# Patient Record
Sex: FEMALE | Race: BLACK OR AFRICAN AMERICAN | NOT HISPANIC OR LATINO | URBAN - NONMETROPOLITAN AREA
[De-identification: names, ages, dates, MRNs, and addresses within clinical notes are randomized per-mention and may not be internally consistent; named-entity substitution may affect disease eponyms.]

---

## 2020-11-19 ENCOUNTER — LAB OUTSIDE AN ENCOUNTER (OUTPATIENT)
Dept: URBAN - NONMETROPOLITAN AREA CLINIC 2 | Facility: CLINIC | Age: 40
End: 2020-11-19

## 2020-11-19 ENCOUNTER — OFFICE VISIT (OUTPATIENT)
Dept: URBAN - NONMETROPOLITAN AREA CLINIC 2 | Facility: CLINIC | Age: 40
End: 2020-11-19
Payer: COMMERCIAL

## 2020-11-19 VITALS
HEART RATE: 73 BPM | SYSTOLIC BLOOD PRESSURE: 123 MMHG | DIASTOLIC BLOOD PRESSURE: 78 MMHG | BODY MASS INDEX: 31.12 KG/M2 | WEIGHT: 186.8 LBS | HEIGHT: 65 IN

## 2020-11-19 DIAGNOSIS — R10.84 GENERALIZED ABDOMINAL PAIN: ICD-10-CM

## 2020-11-19 DIAGNOSIS — K59.00 CONSTIPATION, UNSPECIFIED CONSTIPATION TYPE: ICD-10-CM

## 2020-11-19 PROCEDURE — 99204 OFFICE O/P NEW MOD 45 MIN: CPT | Performed by: NURSE PRACTITIONER

## 2020-11-19 PROCEDURE — G8427 DOCREV CUR MEDS BY ELIG CLIN: HCPCS | Performed by: NURSE PRACTITIONER

## 2020-11-19 PROCEDURE — G8482 FLU IMMUNIZE ORDER/ADMIN: HCPCS | Performed by: NURSE PRACTITIONER

## 2020-11-19 PROCEDURE — G8420 CALC BMI NORM PARAMETERS: HCPCS | Performed by: NURSE PRACTITIONER

## 2020-11-19 NOTE — HPI-TODAY'S VISIT:
11/19/2020 Ms. Tona Luciano is a 40 year old female here for diffuse abdominal pain. The pain started 2 weeks ago. She has lower abdominal pain and right sided lower abdominal pain that radiates up into her back. She has been to the ER 2-3 times. The first time she was told she had a UTI and given antibx. This did not help. The second time, she had an x-ray that showed constipation and she was given a laxative. This caused her abdominal pain to be worse she could not even stand. She had gastric bypass in 2018 so she called her doctor. He recommended she see a GI. She admits to not having daily BM. She will have cramping like she needs to go and can not. She is scared to eat at times. She has a lot of pressure in her chest and it is hard to breath due to the pain. She denies any fever or chills. CS

## 2020-11-19 NOTE — PHYSICAL EXAM GASTROINTESTINAL
Abdomen , soft, extremely tender to light palpation diffusely over her entire abdomen, nondistended , no guarding or rigidity , no masses palpable , normal bowel sounds , Liver and Spleen , no hepatosplenomegaly  Rectal deferred

## 2020-11-20 LAB
A/G RATIO: 1.3
ALBUMIN: 3.9
ALKALINE PHOSPHATASE: 107
ALT (SGPT): 13
AST (SGOT): 20
BASO (ABSOLUTE): 0
BASOS: 1
BILIRUBIN, TOTAL: 0.3
BUN/CREATININE RATIO: 8
BUN: 6
C-REACTIVE PROTEIN, QUANT: 42
CALCIUM: 8.6
CARBON DIOXIDE, TOTAL: 27
CHLORIDE: 102
CREATININE: 0.73
EGFR IF AFRICN AM: 117
EGFR IF NONAFRICN AM: 102
EOS (ABSOLUTE): 0
EOS: 1
GLOBULIN, TOTAL: 3.1
GLUCOSE: 85
HEMATOCRIT: 32.8
HEMATOLOGY COMMENTS:: (no result)
HEMOGLOBIN: 10.2
IMMATURE CELLS: (no result)
IMMATURE GRANS (ABS): 0
IMMATURE GRANULOCYTES: 0
LYMPHS (ABSOLUTE): 1.1
LYMPHS: 33
MCH: 26.6
MCHC: 31.1
MCV: 86
MONOCYTES(ABSOLUTE): 0.3
MONOCYTES: 8
NEUTROPHILS (ABSOLUTE): 2
NEUTROPHILS: 57
NRBC: (no result)
PLATELETS: 404
POTASSIUM: 4.4
PROTEIN, TOTAL: 7
RBC: 3.83
RDW: 15.3
SEDIMENTATION RATE-WESTERGREN: 26
SODIUM: 141
TSH: 1.89
WBC: 3.5

## 2020-11-23 ENCOUNTER — LAB OUTSIDE AN ENCOUNTER (OUTPATIENT)
Dept: URBAN - METROPOLITAN AREA CLINIC 92 | Facility: CLINIC | Age: 40
End: 2020-11-23

## 2020-11-23 ENCOUNTER — TELEPHONE ENCOUNTER (OUTPATIENT)
Dept: URBAN - METROPOLITAN AREA CLINIC 92 | Facility: CLINIC | Age: 40
End: 2020-11-23

## 2020-11-23 PROBLEM — 408574004: Status: ACTIVE | Noted: 2020-11-23

## 2020-12-03 ENCOUNTER — TELEPHONE ENCOUNTER (OUTPATIENT)
Dept: URBAN - METROPOLITAN AREA CLINIC 23 | Facility: CLINIC | Age: 40
End: 2020-12-03

## 2020-12-03 RX ORDER — DICYCLOMINE HYDROCHLORIDE 10 MG/1
1 TABLET AS NEEDED FOR CRAMPING/DIARRHEA CAPSULE ORAL THREE TIMES A DAY
Qty: 90 TABLET | Refills: 6 | OUTPATIENT
Start: 2020-12-03 | End: 2021-07-01

## 2020-12-10 ENCOUNTER — OFFICE VISIT (OUTPATIENT)
Dept: URBAN - NONMETROPOLITAN AREA CLINIC 2 | Facility: CLINIC | Age: 40
End: 2020-12-10

## 2020-12-10 RX ORDER — DICYCLOMINE HYDROCHLORIDE 10 MG/1
1 TABLET AS NEEDED FOR CRAMPING/DIARRHEA CAPSULE ORAL THREE TIMES A DAY
Qty: 90 TABLET | Refills: 6 | Status: ACTIVE | COMMUNITY
Start: 2020-12-03 | End: 2021-07-01

## 2020-12-10 NOTE — HPI-TODAY'S VISIT:
11/19/2020 Ms. Tona Luciano is a 40 year old female here for diffuse abdominal pain. The pain started 2 weeks ago. She has lower abdominal pain and right sided lower abdominal pain that radiates up into her back. She has been to the ER 2-3 times. The first time she was told she had a UTI and given antibx. This did not help. The second time, she had an x-ray that showed constipation and she was given a laxative. This caused her abdominal pain to be worse she could not even stand. She had gastric bypass in 2018 so she called her doctor. He recommended she see a GI. She admits to not having daily BM. She will have cramping like she needs to go and can not. She is scared to eat at times. She has a lot of pressure in her chest and it is hard to breath due to the pain. She denies any fever or chills. CS Statement Selected

## 2020-12-28 ENCOUNTER — OFFICE VISIT (OUTPATIENT)
Dept: URBAN - NONMETROPOLITAN AREA SURGERY CENTER 1 | Facility: SURGERY CENTER | Age: 40
End: 2020-12-28
Payer: COMMERCIAL

## 2020-12-28 DIAGNOSIS — R10.84 ABDOMINAL CRAMPING, GENERALIZED: ICD-10-CM

## 2020-12-28 DIAGNOSIS — K63.89 BACTERIAL OVERGROWTH SYNDROME: ICD-10-CM

## 2020-12-28 PROCEDURE — G8907 PT DOC NO EVENTS ON DISCHARG: HCPCS | Performed by: INTERNAL MEDICINE

## 2020-12-28 PROCEDURE — G9937 DIG OR SURV COLSCO: HCPCS | Performed by: INTERNAL MEDICINE

## 2020-12-28 PROCEDURE — 45385 COLONOSCOPY W/LESION REMOVAL: CPT | Performed by: INTERNAL MEDICINE

## 2020-12-31 ENCOUNTER — WEB ENCOUNTER (OUTPATIENT)
Dept: URBAN - NONMETROPOLITAN AREA CLINIC 2 | Facility: CLINIC | Age: 40
End: 2020-12-31

## 2021-01-27 ENCOUNTER — OFFICE VISIT (OUTPATIENT)
Dept: URBAN - NONMETROPOLITAN AREA CLINIC 2 | Facility: CLINIC | Age: 41
End: 2021-01-27

## 2021-01-27 RX ORDER — DICYCLOMINE HYDROCHLORIDE 10 MG/1
1 TABLET AS NEEDED FOR CRAMPING/DIARRHEA CAPSULE ORAL THREE TIMES A DAY
Qty: 90 TABLET | Refills: 6 | Status: ACTIVE | COMMUNITY
Start: 2020-12-03 | End: 2021-07-01

## 2021-01-27 NOTE — HPI-TODAY'S VISIT:
1/27/2021 Ms. Tona Luciano is here for f/u of diffuse abdominal pain. The pain started in November with lower abdominal pain and right sided lower abdominal pain that radiates up into her back. She has been to the ER 2-3 times told it could be a UTI or constipation. She was given bentyl and scheduled for a colonoscopy. This showed hemorrhoids, a 5mm benign polyp, and a dilated colon. Her prep was poor.

## 2021-12-10 ENCOUNTER — OFFICE VISIT (OUTPATIENT)
Dept: URBAN - METROPOLITAN AREA TELEHEALTH 2 | Facility: TELEHEALTH | Age: 41
End: 2021-12-10
Payer: COMMERCIAL

## 2021-12-10 DIAGNOSIS — R10.84 GENERALIZED ABDOMINAL PAIN: ICD-10-CM

## 2021-12-10 DIAGNOSIS — K59.09 CHANGE IN BOWEL MOVEMENTS INTERMITTENT CONSTIPATION. URGENCY IN THE MORNING.: ICD-10-CM

## 2021-12-10 PROBLEM — 14760008: Status: ACTIVE | Noted: 2020-11-19

## 2021-12-10 PROCEDURE — 99214 OFFICE O/P EST MOD 30 MIN: CPT | Performed by: NURSE PRACTITIONER

## 2021-12-10 RX ORDER — DICYCLOMINE HYDROCHLORIDE 10 MG/1
1 TABLET AS NEEDED FOR CRAMPING/DIARRHEA CAPSULE ORAL THREE TIMES A DAY
Qty: 90 TABLET | Refills: 6
Start: 2020-12-03 | End: 2022-07-08

## 2021-12-10 RX ORDER — LACTULOSE 10 G/15ML
15 ML SOLUTION ORAL ONCE A DAY
Qty: 450 ML | Refills: 6 | OUTPATIENT
Start: 2021-12-10 | End: 2022-07-08

## 2021-12-10 NOTE — HPI-TODAY'S VISIT:
12/10/2021 Ms. Tona Luciano is evaluated via telehealth for f/u of diffuse abdominal pain. The pain started in November 2020 with lower abdominal pain and right sided lower abdominal pain that radiates up into her back. She has been to the ER 2-3 times told it could be a UTI or constipation. She was given bentyl and scheduled for a colonoscopy. This showed hemorrhoids, a 5mm benign polyp, and a dilated colon. Her prep was poor. She was advised to take miralax 1-2 times a day and bentyl prn. She started having diarrhea and went to the ER in January. She had normal labs, no imaging. She was told to hold her miralax and f/u with GI. She did not follow up and has done ok until about 3 weeks ago. All the pain returned, she is having bloating and abdominal distention. She is not having regular BM and when she does go it is not complete. She denies any blood in her stool. Her weight is stable. CS

## 2022-01-03 ENCOUNTER — TELEPHONE ENCOUNTER (OUTPATIENT)
Dept: URBAN - NONMETROPOLITAN AREA CLINIC 13 | Facility: CLINIC | Age: 42
End: 2022-01-03

## 2022-01-03 RX ORDER — LINACLOTIDE 290 UG/1
1 CAPSULE AT LEAST 30 MINUTES BEFORE THE FIRST MEAL OF THE DAY ON AN EMPTY STOMACH CAPSULE, GELATIN COATED ORAL ONCE A DAY
Qty: 90 | Refills: 3 | OUTPATIENT
Start: 2022-01-04 | End: 2022-12-30

## 2022-01-03 RX ORDER — DICYCLOMINE HYDROCHLORIDE 10 MG/1
1 TABLET AS NEEDED FOR CRAMPING/DIARRHEA CAPSULE ORAL THREE TIMES A DAY
Qty: 90 TABLET | Refills: 6 | Status: ACTIVE | COMMUNITY
Start: 2020-12-03 | End: 2022-07-08

## 2022-01-03 RX ORDER — LACTULOSE 10 G/15ML
15 ML SOLUTION ORAL ONCE A DAY
Qty: 450 ML | Refills: 6 | Status: ACTIVE | COMMUNITY
Start: 2021-12-10 | End: 2022-07-08

## 2022-01-25 ENCOUNTER — OFFICE VISIT (OUTPATIENT)
Dept: URBAN - NONMETROPOLITAN AREA CLINIC 13 | Facility: CLINIC | Age: 42
End: 2022-01-25

## 2022-01-25 ENCOUNTER — DASHBOARD ENCOUNTERS (OUTPATIENT)
Age: 42
End: 2022-01-25

## 2022-01-25 RX ORDER — DICYCLOMINE HYDROCHLORIDE 10 MG/1
1 TABLET AS NEEDED FOR CRAMPING/DIARRHEA CAPSULE ORAL THREE TIMES A DAY
Qty: 90 TABLET | Refills: 6 | Status: ACTIVE | COMMUNITY
Start: 2020-12-03 | End: 2022-07-08

## 2022-01-25 RX ORDER — LINACLOTIDE 290 UG/1
1 CAPSULE AT LEAST 30 MINUTES BEFORE THE FIRST MEAL OF THE DAY ON AN EMPTY STOMACH CAPSULE, GELATIN COATED ORAL ONCE A DAY
Qty: 90 | Refills: 3 | Status: ACTIVE | COMMUNITY
Start: 2022-01-04 | End: 2022-12-30

## 2022-01-25 RX ORDER — LACTULOSE 10 G/15ML
15 ML SOLUTION ORAL ONCE A DAY
Qty: 450 ML | Refills: 6 | Status: ACTIVE | COMMUNITY
Start: 2021-12-10 | End: 2022-07-08

## 2024-09-05 NOTE — PHYSICAL EXAM CARDIOVASCULAR:
3cc viscous lidocaine inhaled into left nare per MD orders.  Probe inserted into  left nare with difficulty. Nurse met resistance and was not able to advance catheter. Attempted on right side 3cc of lidocaine used and nurse could not advanced catheter, pt complaint of pain. Procedure was d/c.      no edema, no murmurs, regular rate and rhythm